# Patient Record
Sex: MALE | Race: WHITE | NOT HISPANIC OR LATINO | Employment: OTHER | URBAN - METROPOLITAN AREA
[De-identification: names, ages, dates, MRNs, and addresses within clinical notes are randomized per-mention and may not be internally consistent; named-entity substitution may affect disease eponyms.]

---

## 2020-01-01 ENCOUNTER — APPOINTMENT (OUTPATIENT)
Dept: CARDIOLOGY | Facility: MEDICAL CENTER | Age: 75
DRG: 871 | End: 2020-01-01
Attending: INTERNAL MEDICINE
Payer: COMMERCIAL

## 2020-01-01 ENCOUNTER — APPOINTMENT (OUTPATIENT)
Dept: RADIOLOGY | Facility: MEDICAL CENTER | Age: 75
DRG: 871 | End: 2020-01-01
Attending: INTERNAL MEDICINE
Payer: COMMERCIAL

## 2020-01-01 ENCOUNTER — HOSPITAL ENCOUNTER (INPATIENT)
Facility: MEDICAL CENTER | Age: 75
LOS: 1 days | DRG: 871 | End: 2020-05-29
Attending: EMERGENCY MEDICINE | Admitting: INTERNAL MEDICINE
Payer: COMMERCIAL

## 2020-01-01 ENCOUNTER — HOSPITAL ENCOUNTER (OUTPATIENT)
Dept: RADIOLOGY | Facility: MEDICAL CENTER | Age: 75
End: 2020-05-29
Payer: MEDICARE

## 2020-01-01 ENCOUNTER — APPOINTMENT (OUTPATIENT)
Dept: RADIOLOGY | Facility: MEDICAL CENTER | Age: 75
DRG: 871 | End: 2020-01-01
Attending: EMERGENCY MEDICINE
Payer: COMMERCIAL

## 2020-01-01 VITALS
HEIGHT: 70 IN | RESPIRATION RATE: 30 BRPM | DIASTOLIC BLOOD PRESSURE: 47 MMHG | TEMPERATURE: 92.8 F | OXYGEN SATURATION: 61 % | SYSTOLIC BLOOD PRESSURE: 62 MMHG | HEART RATE: 62 BPM | BODY MASS INDEX: 24.62 KG/M2 | WEIGHT: 171.96 LBS

## 2020-01-01 LAB
ABO GROUP BLD: NORMAL
ACTION RANGE TRIGGERED IACRT: YES
ACTION RANGE TRIGGERED IACRT: YES
ALBUMIN SERPL BCP-MCNC: 2.5 G/DL (ref 3.2–4.9)
ALBUMIN/GLOB SERPL: 0.8 G/DL
ALP SERPL-CCNC: 195 U/L (ref 30–99)
ALT SERPL-CCNC: 2461 U/L (ref 2–50)
ANION GAP SERPL CALC-SCNC: 29 MMOL/L (ref 7–16)
ANISOCYTOSIS BLD QL SMEAR: ABNORMAL
APPEARANCE UR: ABNORMAL
APTT PPP: 217 SEC (ref 24.7–36)
AST SERPL-CCNC: 3715 U/L (ref 12–45)
BACTERIA #/AREA URNS HPF: ABNORMAL /HPF
BASE EXCESS BLDA CALC-SCNC: -20 MMOL/L (ref -4–3)
BASE EXCESS BLDA CALC-SCNC: -23 MMOL/L (ref -4–3)
BASOPHILS # BLD AUTO: 1.7 % (ref 0–1.8)
BASOPHILS # BLD: 0.19 K/UL (ref 0–0.12)
BILIRUB SERPL-MCNC: 2.1 MG/DL (ref 0.1–1.5)
BILIRUB UR QL STRIP.AUTO: NEGATIVE
BLD GP AB SCN SERPL QL: NORMAL
BODY TEMPERATURE: ABNORMAL DEGREES
BODY TEMPERATURE: ABNORMAL DEGREES
BUN SERPL-MCNC: 29 MG/DL (ref 8–22)
BURR CELLS BLD QL SMEAR: NORMAL
CALCIUM SERPL-MCNC: 7.9 MG/DL (ref 8.5–10.5)
CHLORIDE SERPL-SCNC: 99 MMOL/L (ref 96–112)
CO2 BLDA-SCNC: 14 MMOL/L (ref 20–33)
CO2 BLDA-SCNC: <10 MMOL/L (ref 20–33)
CO2 SERPL-SCNC: 11 MMOL/L (ref 20–33)
COLOR UR: YELLOW
COVID ORDER STATUS COVID19: NORMAL
CREAT SERPL-MCNC: 2.02 MG/DL (ref 0.5–1.4)
EOSINOPHIL # BLD AUTO: 0.1 K/UL (ref 0–0.51)
EOSINOPHIL NFR BLD: 0.9 % (ref 0–6.9)
EPI CELLS #/AREA URNS HPF: ABNORMAL /HPF
ERYTHROCYTE [DISTWIDTH] IN BLOOD BY AUTOMATED COUNT: 55 FL (ref 35.9–50)
GIANT PLATELETS BLD QL SMEAR: NORMAL
GLOBULIN SER CALC-MCNC: 3.1 G/DL (ref 1.9–3.5)
GLUCOSE SERPL-MCNC: 222 MG/DL (ref 65–99)
GLUCOSE UR STRIP.AUTO-MCNC: NEGATIVE MG/DL
HCO3 BLDA-SCNC: 12.1 MMOL/L (ref 17–25)
HCO3 BLDA-SCNC: 7.8 MMOL/L (ref 17–25)
HCT VFR BLD AUTO: 37.1 % (ref 42–52)
HGB BLD-MCNC: 10.6 G/DL (ref 14–18)
HOROWITZ INDEX BLDA+IHG-RTO: 121 MM[HG]
HOROWITZ INDEX BLDA+IHG-RTO: 323 MM[HG]
INR PPP: 3.57 (ref 0.87–1.13)
INST. QUALIFIED PATIENT IIQPT: YES
INST. QUALIFIED PATIENT IIQPT: YES
KETONES UR STRIP.AUTO-MCNC: NEGATIVE MG/DL
LACTATE BLD-SCNC: 13.6 MMOL/L (ref 0.5–2)
LACTATE BLD-SCNC: 14.5 MMOL/L (ref 0.5–2)
LEUKOCYTE ESTERASE UR QL STRIP.AUTO: ABNORMAL
LV EJECT FRACT  99904: 25
LV EJECT FRACT MOD 4C 99902: 10.41
LYMPHOCYTES # BLD AUTO: 1.15 K/UL (ref 1–4.8)
LYMPHOCYTES NFR BLD: 10.3 % (ref 22–41)
MACROCYTES BLD QL SMEAR: ABNORMAL
MANUAL DIFF BLD: NORMAL
MCH RBC QN AUTO: 31.2 PG (ref 27–33)
MCHC RBC AUTO-ENTMCNC: 28.6 G/DL (ref 33.7–35.3)
MCV RBC AUTO: 109.1 FL (ref 81.4–97.8)
METAMYELOCYTES NFR BLD MANUAL: 0.9 %
MICRO URNS: ABNORMAL
MONOCYTES # BLD AUTO: 0.29 K/UL (ref 0–0.85)
MONOCYTES NFR BLD AUTO: 2.6 % (ref 0–13.4)
MORPHOLOGY BLD-IMP: NORMAL
NEUTROPHILS # BLD AUTO: 9.36 K/UL (ref 1.82–7.42)
NEUTROPHILS NFR BLD: 78.4 % (ref 44–72)
NEUTS BAND NFR BLD MANUAL: 5.2 % (ref 0–10)
NITRITE UR QL STRIP.AUTO: POSITIVE
NRBC # BLD AUTO: 0.02 K/UL
NRBC BLD-RTO: 0.2 /100 WBC
NT-PROBNP SERPL IA-MCNC: ABNORMAL PG/ML (ref 0–125)
O2/TOTAL GAS SETTING VFR VENT: 100 %
O2/TOTAL GAS SETTING VFR VENT: 100 %
PCO2 BLDA: 35 MMHG (ref 26–37)
PCO2 BLDA: 54.6 MMHG (ref 26–37)
PH BLDA: 6.95 [PH] (ref 7.4–7.5)
PH BLDA: 6.96 [PH] (ref 7.4–7.5)
PH UR STRIP.AUTO: 6 [PH] (ref 5–8)
PLATELET # BLD AUTO: 52 K/UL (ref 164–446)
PLATELET BLD QL SMEAR: NORMAL
PMV BLD AUTO: 11.3 FL (ref 9–12.9)
PO2 BLDA: 121 MMHG (ref 64–87)
PO2 BLDA: 323 MMHG (ref 64–87)
POIKILOCYTOSIS BLD QL SMEAR: NORMAL
POTASSIUM SERPL-SCNC: 5.7 MMOL/L (ref 3.6–5.5)
PROT SERPL-MCNC: 5.6 G/DL (ref 6–8.2)
PROT UR QL STRIP: 200 MG/DL
PROTHROMBIN TIME: 37.1 SEC (ref 12–14.6)
RBC # BLD AUTO: 3.4 M/UL (ref 4.7–6.1)
RBC # URNS HPF: ABNORMAL /HPF
RBC BLD AUTO: PRESENT
RBC UR QL AUTO: ABNORMAL
RH BLD: NORMAL
SAO2 % BLDA: 100 % (ref 93–99)
SAO2 % BLDA: 95 % (ref 93–99)
SARS-COV-2 RNA RESP QL NAA+PROBE: NOTDETECTED
SODIUM SERPL-SCNC: 139 MMOL/L (ref 135–145)
SP GR UR STRIP.AUTO: 1.01
SPECIMEN DRAWN FROM PATIENT: ABNORMAL
SPECIMEN DRAWN FROM PATIENT: ABNORMAL
SPECIMEN SOURCE: NORMAL
TROPONIN T SERPL-MCNC: 5078 NG/L (ref 6–19)
UROBILINOGEN UR STRIP.AUTO-MCNC: 0.2 MG/DL
WBC # BLD AUTO: 11.2 K/UL (ref 4.8–10.8)
WBC #/AREA URNS HPF: ABNORMAL /HPF

## 2020-01-01 PROCEDURE — 5A2204Z RESTORATION OF CARDIAC RHYTHM, SINGLE: ICD-10-PCS | Performed by: EMERGENCY MEDICINE

## 2020-01-01 PROCEDURE — 36415 COLL VENOUS BLD VENIPUNCTURE: CPT

## 2020-01-01 PROCEDURE — 87077 CULTURE AEROBIC IDENTIFY: CPT

## 2020-01-01 PROCEDURE — 93308 TTE F-UP OR LMTD: CPT

## 2020-01-01 PROCEDURE — 03HY32Z INSERTION OF MONITORING DEVICE INTO UPPER ARTERY, PERCUTANEOUS APPROACH: ICD-10-PCS | Performed by: INTERNAL MEDICINE

## 2020-01-01 PROCEDURE — 81001 URINALYSIS AUTO W/SCOPE: CPT

## 2020-01-01 PROCEDURE — 700102 HCHG RX REV CODE 250 W/ 637 OVERRIDE(OP): Performed by: INTERNAL MEDICINE

## 2020-01-01 PROCEDURE — 85610 PROTHROMBIN TIME: CPT

## 2020-01-01 PROCEDURE — 86850 RBC ANTIBODY SCREEN: CPT

## 2020-01-01 PROCEDURE — 3E043XZ INTRODUCTION OF VASOPRESSOR INTO CENTRAL VEIN, PERCUTANEOUS APPROACH: ICD-10-PCS | Performed by: EMERGENCY MEDICINE

## 2020-01-01 PROCEDURE — 700101 HCHG RX REV CODE 250: Performed by: EMERGENCY MEDICINE

## 2020-01-01 PROCEDURE — 86901 BLOOD TYPING SEROLOGIC RH(D): CPT

## 2020-01-01 PROCEDURE — 70450 CT HEAD/BRAIN W/O DYE: CPT

## 2020-01-01 PROCEDURE — 99291 CRITICAL CARE FIRST HOUR: CPT

## 2020-01-01 PROCEDURE — U0004 COV-19 TEST NON-CDC HGH THRU: HCPCS

## 2020-01-01 PROCEDURE — 83880 ASSAY OF NATRIURETIC PEPTIDE: CPT

## 2020-01-01 PROCEDURE — 94002 VENT MGMT INPAT INIT DAY: CPT

## 2020-01-01 PROCEDURE — 99238 HOSP IP/OBS DSCHRG MGMT 30/<: CPT | Mod: 59 | Performed by: INTERNAL MEDICINE

## 2020-01-01 PROCEDURE — 96367 TX/PROPH/DG ADDL SEQ IV INF: CPT

## 2020-01-01 PROCEDURE — 83605 ASSAY OF LACTIC ACID: CPT | Mod: 91

## 2020-01-01 PROCEDURE — 5A12012 PERFORMANCE OF CARDIAC OUTPUT, SINGLE, MANUAL: ICD-10-PCS | Performed by: EMERGENCY MEDICINE

## 2020-01-01 PROCEDURE — 94760 N-INVAS EAR/PLS OXIMETRY 1: CPT

## 2020-01-01 PROCEDURE — 36620 INSERTION CATHETER ARTERY: CPT | Performed by: INTERNAL MEDICINE

## 2020-01-01 PROCEDURE — 84484 ASSAY OF TROPONIN QUANT: CPT

## 2020-01-01 PROCEDURE — 96368 THER/DIAG CONCURRENT INF: CPT

## 2020-01-01 PROCEDURE — C9803 HOPD COVID-19 SPEC COLLECT: HCPCS | Performed by: EMERGENCY MEDICINE

## 2020-01-01 PROCEDURE — 700105 HCHG RX REV CODE 258: Performed by: EMERGENCY MEDICINE

## 2020-01-01 PROCEDURE — 700101 HCHG RX REV CODE 250

## 2020-01-01 PROCEDURE — 36620 INSERTION CATHETER ARTERY: CPT

## 2020-01-01 PROCEDURE — 99291 CRITICAL CARE FIRST HOUR: CPT | Performed by: INTERNAL MEDICINE

## 2020-01-01 PROCEDURE — 700111 HCHG RX REV CODE 636 W/ 250 OVERRIDE (IP)

## 2020-01-01 PROCEDURE — 80053 COMPREHEN METABOLIC PANEL: CPT

## 2020-01-01 PROCEDURE — 74177 CT ABD & PELVIS W/CONTRAST: CPT

## 2020-01-01 PROCEDURE — 85730 THROMBOPLASTIN TIME PARTIAL: CPT

## 2020-01-01 PROCEDURE — 99292 CRITICAL CARE ADDL 30 MIN: CPT | Mod: 25 | Performed by: INTERNAL MEDICINE

## 2020-01-01 PROCEDURE — 87186 SC STD MICRODIL/AGAR DIL: CPT

## 2020-01-01 PROCEDURE — 99223 1ST HOSP IP/OBS HIGH 75: CPT | Mod: AI | Performed by: INTERNAL MEDICINE

## 2020-01-01 PROCEDURE — 96366 THER/PROPH/DIAG IV INF ADDON: CPT

## 2020-01-01 PROCEDURE — 93308 TTE F-UP OR LMTD: CPT | Mod: 26 | Performed by: INTERNAL MEDICINE

## 2020-01-01 PROCEDURE — 700117 HCHG RX CONTRAST REV CODE 255: Performed by: EMERGENCY MEDICINE

## 2020-01-01 PROCEDURE — 36600 WITHDRAWAL OF ARTERIAL BLOOD: CPT

## 2020-01-01 PROCEDURE — 94770 HCHG CO2 EXPIRED GAS DETERMINATION: CPT

## 2020-01-01 PROCEDURE — 700111 HCHG RX REV CODE 636 W/ 250 OVERRIDE (IP): Performed by: INTERNAL MEDICINE

## 2020-01-01 PROCEDURE — 37799 UNLISTED PX VASCULAR SURGERY: CPT

## 2020-01-01 PROCEDURE — 71045 X-RAY EXAM CHEST 1 VIEW: CPT

## 2020-01-01 PROCEDURE — 5A1935Z RESPIRATORY VENTILATION, LESS THAN 24 CONSECUTIVE HOURS: ICD-10-PCS | Performed by: EMERGENCY MEDICINE

## 2020-01-01 PROCEDURE — 96365 THER/PROPH/DIAG IV INF INIT: CPT

## 2020-01-01 PROCEDURE — 82803 BLOOD GASES ANY COMBINATION: CPT | Mod: 91

## 2020-01-01 PROCEDURE — 96375 TX/PRO/DX INJ NEW DRUG ADDON: CPT

## 2020-01-01 PROCEDURE — 85027 COMPLETE CBC AUTOMATED: CPT

## 2020-01-01 PROCEDURE — 92950 HEART/LUNG RESUSCITATION CPR: CPT

## 2020-01-01 PROCEDURE — 770022 HCHG ROOM/CARE - ICU (200)

## 2020-01-01 PROCEDURE — 71275 CT ANGIOGRAPHY CHEST: CPT

## 2020-01-01 PROCEDURE — 85007 BL SMEAR W/DIFF WBC COUNT: CPT

## 2020-01-01 PROCEDURE — 33210 INSERT ELECTRD/PM CATH SNGL: CPT | Performed by: INTERNAL MEDICINE

## 2020-01-01 PROCEDURE — 86900 BLOOD TYPING SEROLOGIC ABO: CPT

## 2020-01-01 PROCEDURE — 99291 CRITICAL CARE FIRST HOUR: CPT | Mod: 25 | Performed by: INTERNAL MEDICINE

## 2020-01-01 PROCEDURE — 700105 HCHG RX REV CODE 258: Performed by: INTERNAL MEDICINE

## 2020-01-01 PROCEDURE — 87086 URINE CULTURE/COLONY COUNT: CPT

## 2020-01-01 PROCEDURE — C1894 INTRO/SHEATH, NON-LASER: HCPCS

## 2020-01-01 RX ORDER — HEPARIN SODIUM 5000 [USP'U]/100ML
INJECTION, SOLUTION INTRAVENOUS CONTINUOUS
Status: DISCONTINUED | OUTPATIENT
Start: 2020-01-01 | End: 2020-01-01

## 2020-01-01 RX ORDER — SODIUM CHLORIDE 9 MG/ML
1000 INJECTION, SOLUTION INTRAVENOUS ONCE
Status: COMPLETED | OUTPATIENT
Start: 2020-01-01 | End: 2020-01-01

## 2020-01-01 RX ORDER — FAMOTIDINE 20 MG/1
20 TABLET, FILM COATED ORAL DAILY
Status: DISCONTINUED | OUTPATIENT
Start: 2020-05-30 | End: 2020-01-01

## 2020-01-01 RX ORDER — POLYETHYLENE GLYCOL 3350 17 G/17G
1 POWDER, FOR SOLUTION ORAL
Status: DISCONTINUED | OUTPATIENT
Start: 2020-01-01 | End: 2020-01-01 | Stop reason: HOSPADM

## 2020-01-01 RX ORDER — PHENYLEPHRINE HCL IN 0.9% NACL 0.5 MG/5ML
SYRINGE (ML) INTRAVENOUS
Status: DISCONTINUED
Start: 2020-01-01 | End: 2020-01-01 | Stop reason: HOSPADM

## 2020-01-01 RX ORDER — LIDOCAINE HYDROCHLORIDE 20 MG/ML
INJECTION, SOLUTION INFILTRATION; PERINEURAL
Status: COMPLETED
Start: 2020-01-01 | End: 2020-01-01

## 2020-01-01 RX ORDER — PHENYLEPHRINE HCL IN 0.9% NACL 0.5 MG/5ML
100 SYRINGE (ML) INTRAVENOUS
Status: DISCONTINUED | OUTPATIENT
Start: 2020-01-01 | End: 2020-01-01

## 2020-01-01 RX ORDER — EPINEPHRINE HCL IN 0.9 % NACL 4MG/250ML
0-10 PLASTIC BAG, INJECTION (ML) INTRAVENOUS CONTINUOUS
Status: DISCONTINUED | OUTPATIENT
Start: 2020-01-01 | End: 2020-01-01

## 2020-01-01 RX ORDER — IPRATROPIUM BROMIDE AND ALBUTEROL SULFATE 2.5; .5 MG/3ML; MG/3ML
3 SOLUTION RESPIRATORY (INHALATION)
Status: DISCONTINUED | OUTPATIENT
Start: 2020-01-01 | End: 2020-01-01 | Stop reason: HOSPADM

## 2020-01-01 RX ORDER — HEPARIN SODIUM,PORCINE 1000/ML
VIAL (ML) INJECTION
Status: COMPLETED
Start: 2020-01-01 | End: 2020-01-01

## 2020-01-01 RX ORDER — NOREPINEPHRINE BITARTRATE 0.03 MG/ML
0-30 INJECTION, SOLUTION INTRAVENOUS CONTINUOUS
Status: DISCONTINUED | OUTPATIENT
Start: 2020-01-01 | End: 2020-01-01

## 2020-01-01 RX ORDER — PHENYLEPHRINE HCL IN 0.9% NACL 0.5 MG/5ML
100-200 SYRINGE (ML) INTRAVENOUS
Status: DISPENSED | OUTPATIENT
Start: 2020-01-01 | End: 2020-01-01

## 2020-01-01 RX ORDER — DEXTROSE MONOHYDRATE 25 G/50ML
50 INJECTION, SOLUTION INTRAVENOUS
Status: DISCONTINUED | OUTPATIENT
Start: 2020-01-01 | End: 2020-01-01 | Stop reason: HOSPADM

## 2020-01-01 RX ORDER — PHENYLEPHRINE HYDROCHLORIDE 10 MG/ML
INJECTION, SOLUTION INTRAMUSCULAR; INTRAVENOUS; SUBCUTANEOUS
Status: DISCONTINUED
Start: 2020-01-01 | End: 2020-01-01 | Stop reason: HOSPADM

## 2020-01-01 RX ORDER — AMOXICILLIN 250 MG
2 CAPSULE ORAL 2 TIMES DAILY
Status: DISCONTINUED | OUTPATIENT
Start: 2020-01-01 | End: 2020-01-01 | Stop reason: HOSPADM

## 2020-01-01 RX ORDER — SODIUM CHLORIDE 9 MG/ML
2000 INJECTION, SOLUTION INTRAVENOUS ONCE
Status: COMPLETED | OUTPATIENT
Start: 2020-01-01 | End: 2020-01-01

## 2020-01-01 RX ORDER — BISACODYL 10 MG
10 SUPPOSITORY, RECTAL RECTAL
Status: DISCONTINUED | OUTPATIENT
Start: 2020-01-01 | End: 2020-01-01 | Stop reason: HOSPADM

## 2020-01-01 RX ORDER — NOREPINEPHRINE BITARTRATE 0.03 MG/ML
INJECTION, SOLUTION INTRAVENOUS
Status: DISCONTINUED
Start: 2020-01-01 | End: 2020-01-01 | Stop reason: HOSPADM

## 2020-01-01 RX ADMIN — EPINEPHRINE 20 MCG/MIN: 1 INJECTION INTRAMUSCULAR; INTRAVENOUS; SUBCUTANEOUS at 16:56

## 2020-01-01 RX ADMIN — CALCIUM GLUCONATE 2 G: 98 INJECTION, SOLUTION INTRAVENOUS at 16:12

## 2020-01-01 RX ADMIN — NOREPINEPHRINE BITARTRATE 80 MCG/MIN: 1 INJECTION INTRAVENOUS at 15:31

## 2020-01-01 RX ADMIN — Medication 200 MCG: at 14:59

## 2020-01-01 RX ADMIN — SODIUM CHLORIDE 2000 ML: 9 INJECTION, SOLUTION INTRAVENOUS at 13:45

## 2020-01-01 RX ADMIN — DOBUTAMINE HYDROCHLORIDE 20 MCG/KG/MIN: 100 INJECTION INTRAVENOUS at 13:50

## 2020-01-01 RX ADMIN — IOHEXOL 100 ML: 350 INJECTION, SOLUTION INTRAVENOUS at 08:45

## 2020-01-01 RX ADMIN — Medication 200 MCG: at 14:46

## 2020-01-01 RX ADMIN — NOREPINEPHRINE BITARTRATE 30 MCG/MIN: 1 INJECTION INTRAVENOUS at 12:35

## 2020-01-01 RX ADMIN — SODIUM BICARBONATE 150 MEQ: 84 INJECTION, SOLUTION INTRAVENOUS at 15:32

## 2020-01-01 RX ADMIN — HEPARIN SODIUM: 1000 INJECTION, SOLUTION INTRAVENOUS; SUBCUTANEOUS at 16:58

## 2020-01-01 RX ADMIN — EPINEPHRINE 2 MCG/MIN: 1 INJECTION INTRAMUSCULAR; INTRAVENOUS; SUBCUTANEOUS at 12:50

## 2020-01-01 RX ADMIN — PHENYLEPHRINE HYDROCHLORIDE 50 MCG/MIN: 10 INJECTION INTRAVENOUS at 14:57

## 2020-01-01 RX ADMIN — VASOPRESSIN 0.03 UNITS/MIN: 20 INJECTION INTRAVENOUS at 15:32

## 2020-01-01 RX ADMIN — SODIUM CHLORIDE 1000 ML: 9 INJECTION, SOLUTION INTRAVENOUS at 13:15

## 2020-01-01 RX ADMIN — LIDOCAINE HYDROCHLORIDE: 20 INJECTION, SOLUTION INFILTRATION; PERINEURAL at 16:58

## 2020-05-29 PROBLEM — R00.1 BRADYCARDIA: Status: ACTIVE | Noted: 2020-01-01

## 2020-05-29 PROBLEM — R73.9 HYPERGLYCEMIA: Status: ACTIVE | Noted: 2020-01-01

## 2020-05-29 PROBLEM — R57.9 SHOCK (HCC): Status: ACTIVE | Noted: 2020-01-01

## 2020-05-29 PROBLEM — I71.21 ASCENDING AORTIC ANEURYSM (HCC): Status: ACTIVE | Noted: 2020-01-01

## 2020-05-29 PROBLEM — R74.8 ELEVATED LIVER ENZYMES: Status: ACTIVE | Noted: 2020-01-01

## 2020-05-29 PROBLEM — J96.01 ACUTE RESPIRATORY FAILURE WITH HYPOXIA (HCC): Status: ACTIVE | Noted: 2020-01-01

## 2020-05-29 PROBLEM — R65.21 SEPTIC SHOCK (HCC): Status: ACTIVE | Noted: 2020-01-01

## 2020-05-29 PROBLEM — E87.29 METABOLIC ACIDOSIS, INCREASED ANION GAP: Status: ACTIVE | Noted: 2020-01-01

## 2020-05-29 PROBLEM — A41.9 SEPTIC SHOCK (HCC): Status: ACTIVE | Noted: 2020-01-01

## 2020-05-29 PROBLEM — I46.9 CARDIAC ARREST (HCC): Status: ACTIVE | Noted: 2020-01-01

## 2020-05-29 NOTE — ASSESSMENT & PLAN NOTE
He found to have shock it could be septic shock or cardiogenic.  Started him on pressor pain  Broad-spectrum antibiotic.  IV fluids.  Admit to ICU for close monitoring.

## 2020-05-29 NOTE — ED NOTES
Lab called with critical result of LACTIC ACID 14.5 at 1312. Critical lab result read back to LAB.    notified of critical lab result at 1313.  Critical lab result read back by Dr. LOWRY.

## 2020-05-29 NOTE — DISCHARGE PLANNING
Care Transition Team Discharge Planning    Anticipated Discharge Disposition: TBD    Action: Received report from ER Elin Samson, see her previous note.    Barriers to Discharge: TBD    Plan: f/u w/ medical team

## 2020-05-29 NOTE — ED NOTES
Spoke to [facility], keep updated as necessary and of any changes in code status or condition change.

## 2020-05-29 NOTE — CONSULTS
Critical Care Consultation    Date of consult: 5/29/2020    Referring Physician  Wong Tapia M.D.    Reason for Consultation  Cardiac arrest    History of Presenting Illness  74 y.o. male who presented 5/29/2020 with respiratory failure and shortness of breath at outlying facility. He was intubated. Following intubation had cardiac arrest for approximately 1 minute.  ROSC achieved. Additional short arrest on the transport to this facility for 1-2 minutes. He had additional cardiac arrest in ER for 3 rounds of cpr and received sodium bicarb.  He had a sbp of 40-60 systolic on levophed and epinephrine, I increased levophed to 50 mcg/min and epinephrine to 20 mcg/min and added neosynephrine and vasopressin.  He was sent for ct head of which appears normal on my read and cta chest that does not appear to have PE. He has small bilateral effusions, some changes for aspiration and has ascending aortic aneurysm.  Ph 6.9. Lactic acid 10.  Has pacing subcutaneously.  Cardiology saw and no intervention for now. Received TPA at outside facility for ? STEMI.  No family contacts.  Social work working on finding contacts.  Ordered 2 L NS bolus. Pending labs.  covid pending but less likely primary cause of respiratory failure. Given ceftriaxone. Ordered zosyn and vanco for sepsis unknown etiology. On ventilator, rate adjusted to 30 and volume to 500 ml based on abg.      Code Status  Full code    Review of Systems  Review of Systems   Unable to perform ROS: Intubated       Past Medical History   has a past medical history of Fall, Seizure, and Unspecified urinary incontinence. He also has no past medical history of Diabetes.    Surgical History   has a past surgical history that includes other orthopedic surgery and other neurological surg.    Family History  family history is not on file.    Social History   reports that he has been smoking cigarettes. He has a 60.00 pack-year smoking history. He does not have any smokeless  tobacco history on file. He reports that he does not drink alcohol or use drugs.    Medications  Home Medications    **Home medications have not yet been reviewed for this encounter**       Current Facility-Administered Medications   Medication Dose Route Frequency Provider Last Rate Last Dose   • EPINEPHrine (Adrenalin) infusion 4 mg/250 mL (premix)  0-10 mcg/min Intravenous Continuous Wong Tapia M.D.       • norepinephrine (Levophed) infusion 8 mg/250 mL (premix)  0-30 mcg/min Intravenous Continuous Wong Tapia M.D.         Current Outpatient Medications   Medication Sig Dispense Refill   • amantadine (SYMMETREL) 100 MG CAPS Take 100 mg by mouth 2 times a day.     • divalproex EC (DEPAKOTE) 250 MG TBEC Take 250 mg by mouth 3 times a day.     • metoprolol SR (TOPROL XL) 25 MG TB24 Take 25 mg by mouth 2 Times a Day.     • simvastatin (ZOCOR) 20 MG TABS Take 20 mg by mouth every evening.     • docusate sodium (COLACE) 100 MG CAPS Take 100 mg by mouth every day.     • famotidine (PEPCID) 20 MG TABS Take 20 mg by mouth 2 times a day.     • multivitamin (THERAGRAN) TABS Take 1 Tab by mouth every day.     • ascorbic acid (ASCORBIC ACID) 500 MG TABS Take 500 mg by mouth 2 Times a Day.     • vitamin D (CHOLECALCIFEROL) 1000 UNIT TABS Take 1,000 Units by mouth every day.     • lactobacillus rhamnosus, GG, (CULTURELLE) 10 B CELL CAPS Take 1 Cap by mouth every day.     • benzonatate (TESSALON) 100 MG CAPS Take 100 mg by mouth 3 times a day.     • pregabalin (LYRICA) 75 MG CAPS Take 75 mg by mouth 2 times a day.     • amitriptyline (ELAVIL) 50 MG TABS Take 50 mg by mouth every day.     • tramadol (ULTRAM) 50 MG TABS Take 100 mg by mouth 4 times a day.     • diazepam (VALIUM) 5 MG TABS Take 5 mg by mouth 3 times a day as needed.     • hydrochlorothiazide (HYDRODIURIL) 25 MG TABS Take 12.5 mg by mouth 2 Times a Day.     • lisinopril (PRINIVIL) 20 MG TABS Take 20 mg by mouth 2 times a day.     • baclofen (LIORESAL) 10 MG  TABS Take 300 mg by mouth 3 times a day.     • amlodipine (NORVASC) 5 MG TABS Take 5 mg by mouth every day.     • ketoconazole (NIZORAL) 2 % CREA Apply  to affected area(s) every day. For rash          Allergies  Allergies   Allergen Reactions   • Demerol      Pt states his heart stops when he is given this drug   • Flomax [Kdc:Brilliant Blue Fcf+Tamsulosin]        Vital Signs last 24 hours  Pulse:  [60-68] 68  Resp:  [19-20] 19  BP: (51)/(29) 51/29  SpO2:  [93 %-100 %] 93 %    Physical Exam  Physical Exam  Vitals signs and nursing note reviewed.   Constitutional:       General: He is in acute distress.      Appearance: He is ill-appearing and toxic-appearing. He is not diaphoretic.   HENT:      Head: Normocephalic and atraumatic.      Right Ear: External ear normal.      Left Ear: External ear normal.      Nose: No congestion or rhinorrhea.      Mouth/Throat:      Mouth: Mucous membranes are dry.      Pharynx: No oropharyngeal exudate or posterior oropharyngeal erythema.   Eyes:      General: No scleral icterus.        Right eye: No discharge.         Left eye: No discharge.      Conjunctiva/sclera: Conjunctivae normal.   Neck:      Musculoskeletal: Normal range of motion. No neck rigidity or muscular tenderness.   Cardiovascular:      Rate and Rhythm: Regular rhythm. Bradycardia present.      Pulses: Normal pulses.      Heart sounds: Normal heart sounds. No murmur.   Pulmonary:      Effort: Respiratory distress present.      Breath sounds: No stridor. No wheezing, rhonchi or rales.      Comments: On ventilator  Chest:      Chest wall: No tenderness.   Abdominal:      General: There is no distension.      Palpations: There is no mass.      Tenderness: There is no abdominal tenderness. There is no guarding.   Musculoskeletal:         General: No swelling, tenderness or deformity.      Right lower leg: No edema.      Left lower leg: No edema.   Lymphadenopathy:      Cervical: No cervical adenopathy.   Skin:      Coloration: Skin is pale. Skin is not jaundiced.      Findings: No bruising, erythema, lesion or rash.      Comments: mottled   Neurological:      Mental Status: He is alert.      Cranial Nerves: No cranial nerve deficit.      Sensory: No sensory deficit.      Motor: No weakness.      Coordination: Coordination normal.      Gait: Gait normal.      Comments: gcs less than 5   Psychiatric:         Mood and Affect: Mood normal.         Behavior: Behavior normal.         Thought Content: Thought content normal.         Judgment: Judgment normal.         Fluids  No intake or output data in the 24 hours ending 05/29/20 1257    Laboratory  No results found for this or any previous visit (from the past 48 hour(s)).    Imaging  CT-ABDOMEN-PELVIS WITH   Final Result      1.  Occlusion of the left external external iliac, common femoral, superficial femoral and profunda femoris arteries secondary to advanced atherosclerotic disease.   2.  Trace bilateral pleural effusions, left greater than right with associated compressive atelectasis and/or consolidation.   3.  Complex appearing left inferior renal cyst. Further evaluation of this finding is recommended with ultrasound or dedicated renal MRI when possible.   4.  Bilateral renal atrophy.   5.  Diverticulosis without evidence of diverticulitis.      CT-CTA CHEST PULMONARY ARTERY W/ RECONS   Final Result      1.  No pulmonary embolus.   2.  Small left pleural effusion and associated atelectasis. No pulmonary consolidation.   3.  Emphysema.   4.  Incidental 6 mm nodule in the right upper lobe. Follow-up guidelines for high and low risk patients are outlined below.   5.  A 5 cm ascending aortic aneurysm.            Pulmonary nodule guidelines:      Low Risk: CT at 6-12 months, then consider CT at 18-24 months      High Risk: CT at 6-12 months, then CT at 18-24 months      Low Risk - Minimal or absent history of smoking and of other known risk factors.      High Risk - History of  smoking or of other known risk factors.      Note: These recommendations do not apply to lung cancer screening, patients with immunosuppression, or patients with known primary cancer.      Fleischner Society 2017 Guidelines for Management of Incidentally Detected Pulmonary Nodules in Adults               CT-HEAD W/O   Final Result      1. No CT evidence of acute infarct, hemorrhage or mass.   2. Moderate global parenchymal atrophy. Chronic small vessel ischemic changes.   3. Old left occipital infarct.      OUTSIDE IMAGES-DX CHEST   Final Result      EC-ECHOCARDIOGRAM LTD W/O CONT    (Results Pending)   DX-CHEST-PORTABLE (1 VIEW)    (Results Pending)   DX-CHEST-PORTABLE (1 VIEW)    (Results Pending)       Assessment/Plan  Ascending aortic aneurysm (HCC)  Assessment & Plan  5.2 cm on echo  Seen on ct imaging      Hyperglycemia  Assessment & Plan  ? Diabetes  hgaic  Likely from inflammatory event  ssi    Metabolic acidosis, increased anion gap  Assessment & Plan  Secondary to shock  Multifactorial  Fluids  Multiple pressors  Sodium bicarb push and drip    Septic shock (HCC)  Assessment & Plan  Present on admission  Respiratory  Possible but inflammatory event may just be due to cardiogenic shock and arrest  vasopresors  IVF  antibiotics    Bradycardia  Assessment & Plan  Subcutaneous pacing  If stabilizes may need transvenous pacing, discussed with cardiology  echo    Acute respiratory failure with hypoxia (HCC)  Assessment & Plan  Respiratory failure with hypoxia  On ventilator  Cardiac arrest  Sepsis  Ascending aortic aneurysm  Fentanyl push and drip    Cardiac arrest (HCC)  Assessment & Plan  Cardiac arrest, multiple, short duration  Ct head adequatre  Ascending aortic aneurysm  Cardiogenic and septic shock  Multiple pressors  No family available  ? Septic shock    Addendum  On dobutamin from outside, weaned, worsening pressure, then increased and pressure did not change.  Weaned off.  Discussed with cardiology of  whom agreed  Pending pacemaker wires transvenous if stabilizes.    EF on my bedside echo probably less than 10%. Global dysfunction  Adding jamari, epi to 20, levo to 80  Repeat abg  Dr Tapia er physician and I made decision to be DNAR as further code     Addendum  Weaning pressors  Transvenous pacing pending for cath lab, discussed with cardiology  GI bleed upper, started ppi drip  Hb ordered  Stopped heparin drip.    Addendum  Transvenous pacing wires placed    Discussed patient condition and risk of morbidity and/or mortality with RN, RT, Pharmacy, Code status disscussed and Patient.      The patient remains critically ill.  Post cardiac arrest.  Unable to cool as may need procedures and multiple cardiac arrests. Possible septic shock.  On mechanical ventilator.  Epinephrine, vasopressin, neosynephrine, levophed for map > 65 and sbp > 90.  Sodium bicarbonate drip.  Ascending aortic aneurysm.  Heparin drip, stopped because of GI bleeding.  Received antithrombolytics outside facility.  PPI drip.  Critical care time = 175 minutes in directly providing and coordinating critical care and extensive data review.  No time overlap and excludes procedures.

## 2020-05-29 NOTE — CONSULTS
Cardiology Initial Consultation    Date of Service  5/29/2020    Referring Physician  Wong Tapia M.D.    Reason for Consultation  PEA cardiac arrest and respiratory failure    History of Presenting Illness  Warner Sprague is a 74 y.o. male who presented to [place] on 5/29/2020 with respiratory distress and respiratory failure.  At one point he was intubated.  It was thought he may have ST elevation so he was given TPA.  The actual timing of events is not clear.  He was brought by helicopter.  In route he was hypotensive and requiring pressors.  He was bradycardic and was transcutaneously paced.  Upon arrival to [facility], he had no pulse.  2 rounds of CPR and 1 round of epi with return of a femoral pulse.      No other history available.  ECGs reviewed from [facility] contain significant artifact. I did not one ecg with old anterior q waves, another ecg with some aberrancy and possible posterior ST elevation but too much artifact to be sure.  Throughout, sinus bradycardia with sinus arrest, junctional and ventricular escape beats.    I am told lactic acid in [place] was 10 upon arrival.    Patient currently has a blood pressure 61/41 despite epinephrine, Levophed and dobutamine.  He continues to be externally paced.    pH here 6.9  AST and ALT over 3000 and 2000  Creatinine 2.02  Potassium 5.7 in the setting of severe acidosis  Troponin 5000 after CPR  INR 3.57    Review of Systems  Review of Systems   Unable to perform ROS: Intubated       Past Medical History   has a past medical history of Fall, Seizure, and Unspecified urinary incontinence. He also has no past medical history of Diabetes.    Surgical History   has a past surgical history that includes other orthopedic surgery and other neurological surg.    Family History  family history is not on file.    Social History   reports that he has been smoking cigarettes. He has a 60.00 pack-year smoking history. He does not have any smokeless tobacco history on file. He  reports that he does not drink alcohol or use drugs.    Medications  Prior to Admission Medications   Prescriptions Last Dose Informant Patient Reported? Taking?   amantadine (SYMMETREL) 100 MG CAPS   Yes No   Sig: Take 100 mg by mouth 2 times a day.   amitriptyline (ELAVIL) 50 MG TABS   Yes No   Sig: Take 50 mg by mouth every day.   amlodipine (NORVASC) 5 MG TABS   Yes No   Sig: Take 5 mg by mouth every day.   ascorbic acid (ASCORBIC ACID) 500 MG TABS   Yes No   Sig: Take 500 mg by mouth 2 Times a Day.   baclofen (LIORESAL) 10 MG TABS   Yes No   Sig: Take 300 mg by mouth 3 times a day.   benzonatate (TESSALON) 100 MG CAPS   Yes No   Sig: Take 100 mg by mouth 3 times a day.   diazepam (VALIUM) 5 MG TABS   Yes No   Sig: Take 5 mg by mouth 3 times a day as needed.   divalproex EC (DEPAKOTE) 250 MG TBEC   Yes No   Sig: Take 250 mg by mouth 3 times a day.   docusate sodium (COLACE) 100 MG CAPS   Yes No   Sig: Take 100 mg by mouth every day.   famotidine (PEPCID) 20 MG TABS   Yes No   Sig: Take 20 mg by mouth 2 times a day.   hydrochlorothiazide (HYDRODIURIL) 25 MG TABS   Yes No   Sig: Take 12.5 mg by mouth 2 Times a Day.   ketoconazole (NIZORAL) 2 % CREA   Yes No   Sig: Apply  to affected area(s) every day. For rash    lactobacillus rhamnosus, GG, (CULTURELLE) 10 B CELL CAPS   Yes No   Sig: Take 1 Cap by mouth every day.   lisinopril (PRINIVIL) 20 MG TABS   Yes No   Sig: Take 20 mg by mouth 2 times a day.   metoprolol SR (TOPROL XL) 25 MG TB24   Yes No   Sig: Take 25 mg by mouth 2 Times a Day.   multivitamin (THERAGRAN) TABS   Yes No   Sig: Take 1 Tab by mouth every day.   pregabalin (LYRICA) 75 MG CAPS   Yes No   Sig: Take 75 mg by mouth 2 times a day.   simvastatin (ZOCOR) 20 MG TABS   Yes No   Sig: Take 20 mg by mouth every evening.   tramadol (ULTRAM) 50 MG TABS   Yes No   Sig: Take 100 mg by mouth 4 times a day.   vitamin D (CHOLECALCIFEROL) 1000 UNIT TABS   Yes No   Sig: Take 1,000 Units by mouth every day.       Facility-Administered Medications: None       Allergies  Allergies   Allergen Reactions   • Demerol      Pt states his heart stops when he is given this drug   • Flomax [Kdc:Brilliant Blue Fcf+Tamsulosin]        Vital signs in last 24 hours  Pulse:  [60-68] 68  Resp:  [19-20] 19  BP: (51)/(29) 51/29  SpO2:  [93 %-100 %] 93 %    Physical Exam  Physical Exam     General: No acute distress. Well nourished.  Intubated.  HEENT:  No scleral icterus, no pharyngeal erythema, ET tube in place  Neck:  No JVD at 0, no bruits, trachea midline  CVS: RRR.  Distant heart sounds no LE edema.  1+ radial pulses, trace PT pulses, pacemaker pads on chest  Resp: Coarse breath sounds throughout.  Respiratory effort per the ventilator.  Abdomen: Soft, NT, no ricardo hepatomegaly.  Surgical scar right groin  MSK/Ext: No clubbing or cyanosis.  Surgical scar left shoulder  Skin: Dry, cool lower extremities  Neurological: Deferred, intubated and sedated  Psych: Deferred, intubated and sedated        Lab Review  Lab Results   Component Value Date/Time    WBC 7.1 08/21/2012 04:25 AM    RBC 4.03 (L) 08/21/2012 04:25 AM    HEMOGLOBIN 11.9 (L) 08/21/2012 04:25 AM    HEMATOCRIT 38.2 (L) 08/21/2012 04:25 AM    MCV 94.9 08/21/2012 04:25 AM    MCH 29.5 08/21/2012 04:25 AM    MCHC 31.1 08/21/2012 04:25 AM    MPV 7.8 08/21/2012 04:25 AM      Lab Results   Component Value Date/Time    SODIUM 136 08/21/2012 04:25 AM    POTASSIUM 4.3 08/21/2012 04:25 AM    CHLORIDE 102 08/21/2012 04:25 AM    CO2 26 08/21/2012 04:25 AM    GLUCOSE 84 08/21/2012 04:25 AM    BUN 23 (H) 08/21/2012 04:25 AM    CREATININE 1.01 08/21/2012 04:25 AM      Lab Results   Component Value Date/Time    ASTSGOT 16 08/21/2012 04:25 AM    ALTSGPT 18 08/21/2012 04:25 AM     No results found for: CHOLSTRLTOT, LDL, HDL, TRIGLYCERIDE, TROPONINT    No results for input(s): NTPROBNP in the last 72 hours.    Cardiac Imaging and Procedures Review  EKG:  My personal interpretation of the EKG and  telemetry strips dated 5/29/2020 at [place] sinus bradycardia with sinus arrest, junctional and ventricular escape, intermittent aberrancy, probable old anterior MI, less than baseline artifact    ECG here not obtained, patient still externally paced, unsafe to return off external pacemaker given persistent hypotension    Echocardiogram: Pending    Cardiac Catheterization: Pending    Imaging  Chest X-Ray: Pending      Assessment/Plan  -PEA cardiac arrest  -Acute hypoxic respiratory failure  -Acute encephalopathy  -Abnormal ECG, concern for STEMI, status post lytics at outside hospital  -Ongoing shock  -Lactic acidosis  -Metabolic acidosis  -Transaminitis  -Coagulopathy  -MYA  -Hyperkalemia    PLAN:  -Stat CT of head given TPA and acute encephalopathy  -Stat echocardiogram  -He is not a candidate for left heart catheterization due to acidosis, age and I did not see evidence of ST elevation MI  -His prognosis is grim, I do not think he will survive.  Critical care will attempt to further resuscitate.  If they achieve progress, we can consider temporary pacemaker at that time.      Cardiology Critical Care Documentation    This patient is critically ill.  I have spent a total of 40 minutes examining this patient, all lab data, imaging including ecg and echo, and discussion with other providers including MD, RN.    Discussed with Dr. Tapia and Dr. Don    Thank you for allowing me to participate in the care of this patient.    I will continue to follow this patient    Please contact me with any questions.    Meeta Saez M.D.   Cardiologist, [facility]  [phone]

## 2020-05-29 NOTE — ASSESSMENT & PLAN NOTE
Present on admission  Respiratory  Possible but inflammatory event may just be due to cardiogenic shock and arrest  vasopresors  IVF  antibiotics

## 2020-05-29 NOTE — ASSESSMENT & PLAN NOTE
Respiratory failure with hypoxia  On ventilator  Cardiac arrest  Sepsis  Ascending aortic aneurysm  Fentanyl push and drip

## 2020-05-29 NOTE — ED NOTES
Unable to obtain med rec at this time. Left voicemail with emergency contact. No pharmacy on record.

## 2020-05-29 NOTE — ED TRIAGE NOTES
Pt here as transfer from [place]. Pt originally brought in by medics with SOB. Found to have ST elevation and went into respiratory arrest. Pt intubated and hypotensive.     In route pt went into cardiac arrest and had one round of CPR and epi. Currently paced.

## 2020-05-29 NOTE — PROCEDURES
Procedures  Arterial line, right axillary, us guided  Reason: cardiogenic and septic shock    Procedure:  Sterile procedure.  Draped appropriately.  Right axillary artery.  US guided.  Needle inserted with flash obtained.  Guidewire inserted through needle, catheter progressed over wire, wire removed, catheter sutured in place with two sutures, chlorhexidine patch and bandage applied.  Connected appropriately.  No complications. Less than 3 cc blood loss.

## 2020-05-29 NOTE — DISCHARGE PLANNING
Medical Social Work    MSW responded to acute medical pt in [employer]. Pt was transferred from [facility] in [place], [place]. CPR started shortly after arrival. Per flight crew, pt was found by his home health caregiver. No family that they are aware of.     MSW did chart review. Pt had advanced directive on file from . MSW tried calling listed wife/1st  DPOA Lizzie Sprague ([phone] (disconnected) /[phone] (unable to receive phone calls). MSW called alternative DPOA listed Nirmala Ayala ([phone]). Nirmala stated pt's wife is  and pt had AD changed as they had a falling out. Nirmala stated she is not comfortable making decisions for pt. Nirmala stated pt is estranged from any other family.     MSW called [facility] ED ([phone]) and spoke to Ramez in AOD. Ramez stated he will send MSW pt's latest updated AD scanned into their system in . Ramez faxed over updated AD to MSW. Pt does not list a healthcare DPOA on updated paperwork. MSW gave AD to ERP to look over. MSW provided ER PAR Tanika AD to scan into Epic.    MSW did enformion [facility] Search. Nehemiah Chavez listed [phone] stated he was not related to pt. MSW called Margo Prince ([phone]) and Ilya stated he was not related to pt. Kimberly Velazquez is listed as .     MSW will continue to search for [facility]K.     [facility] [place] Referral #[id number].

## 2020-05-29 NOTE — ED PROVIDER NOTES
ED Provider Note    CHIEF COMPLAINT  Chief Complaint   Patient presents with   • Respiratory Arrest       HPI  Bartolo Sprague is a 74 y.o. male who presents to the emergency department transferred by air ambulance from the ER in [facility].  The time of arrival the patient is intubated and unresponsive and unable to provide any history.  The history is obtained by discussion with the ER doctor in [place] over the phone and review of the notes from that facility.  According to the notes the patient arrived in their hospital earlier this morning complaining of shortness of breath he was found to be hypoxic hypothermic and bradycardic with borderline hypotension and also tachypnea.  The patient was intubated in [place] and received thrombolytic therapy for abnormal EKGs and a central line was placed prior to transfer.  The patient was started on norepinephrine and dobutamine and according to the EMS crew the patient had a brief episode of cardiac arrest in the ER at [place] and this only lasted about a minute and he had return of pulses.  During transfer in the helicopter the patient became profoundly bradycardic and the EMS crew maxed out doses of pressor agents and started transcutaneous pacing.  The patient is undergoing transcutaneous pacing at the time of arrival.  No other history of present illness could be obtained from the patient    REVIEW OF SYSTEMS review of systems information could be obtained    PAST MEDICAL HISTORY  Past Medical History:   Diagnosis Date   • Fall    • Seizure    • Unspecified urinary incontinence        FAMILY HISTORY  No family history on file.    SOCIAL HISTORY  Social History     Socioeconomic History   • Marital status: Unknown     Spouse name: Not on file   • Number of children: Not on file   • Years of education: Not on file   • Highest education level: Not on file   Occupational History   • Not on file   Social Needs   • Financial resource strain: Not on file   • Food  insecurity     Worry: Not on file     Inability: Not on file   • Transportation needs     Medical: Not on file     Non-medical: Not on file   Tobacco Use   • Smoking status: Current Every Day Smoker     Packs/day: 1.00     Years: 60.00     Pack years: 60.00     Types: Cigarettes   Substance and Sexual Activity   • Alcohol use: No   • Drug use: No   • Sexual activity: Not on file   Lifestyle   • Physical activity     Days per week: Not on file     Minutes per session: Not on file   • Stress: Not on file   Relationships   • Social connections     Talks on phone: Not on file     Gets together: Not on file     Attends Alevism service: Not on file     Active member of club or organization: Not on file     Attends meetings of clubs or organizations: Not on file     Relationship status: Not on file   • Intimate partner violence     Fear of current or ex partner: Not on file     Emotionally abused: Not on file     Physically abused: Not on file     Forced sexual activity: Not on file   Other Topics Concern   • Not on file   Social History Narrative   • Not on file       SURGICAL HISTORY  Past Surgical History:   Procedure Laterality Date   • OTHER NEUROLOGICAL SURG     • OTHER ORTHOPEDIC SURGERY         CURRENT MEDICATIONS  Home Medications     Reviewed by Sandra Kevin (Pharmacy Tech) on 05/29/20 at 1359  Med List Status: Unable to Obtain   Medication Last Dose Status   amantadine (SYMMETREL) 100 MG CAPS  Active   amitriptyline (ELAVIL) 50 MG TABS  Active   amlodipine (NORVASC) 5 MG TABS  Active   ascorbic acid (ASCORBIC ACID) 500 MG TABS  Active   baclofen (LIORESAL) 10 MG TABS  Active   benzonatate (TESSALON) 100 MG CAPS  Active   diazepam (VALIUM) 5 MG TABS  Active   divalproex EC (DEPAKOTE) 250 MG TBEC  Active   docusate sodium (COLACE) 100 MG CAPS  Active   famotidine (PEPCID) 20 MG TABS  Active   hydrochlorothiazide (HYDRODIURIL) 25 MG TABS  Active   ketoconazole (NIZORAL) 2 % CREA  Active   lactobacillus  "rhamnosus, GG, (CULTURELLE) 10 B CELL CAPS  Active   lisinopril (PRINIVIL) 20 MG TABS  Active   metoprolol SR (TOPROL XL) 25 MG TB24  Active   multivitamin (THERAGRAN) TABS  Active   pregabalin (LYRICA) 75 MG CAPS  Active   simvastatin (ZOCOR) 20 MG TABS  Active   tramadol (ULTRAM) 50 MG TABS  Active   vitamin D (CHOLECALCIFEROL) 1000 UNIT TABS  Active                ALLERGIES  Allergies   Allergen Reactions   • Demerol      Pt states his heart stops when he is given this drug   • Flomax [Kdc:Brilliant Blue Fcf+Tamsulosin]        PHYSICAL EXAM  VITAL SIGNS: BP (!) 75/50   Pulse 60   Temp (!) 35.1 °C (95.1 °F) (Temporal)   Resp (!) 30   Ht 1.778 m (5' 10\")   Wt 78 kg (171 lb 15.3 oz)   SpO2 (!) 66%   BMI 24.67 kg/m²    Oxygen saturation is interpreted as hypoxic with intubation and ventilator support  Constitutional: The patient is completely flaccid and unresponsive  HENT: No obvious signs of trauma to the head there is an endotracheal tube in place  Eyes: Pupils are fixed and the cornea are desiccated there is no jaundice  Neck: Trachea midline no JVD there is a central line in the right side of the neck  Cardiovascular: The patient is being paced transcutaneously at the time of arrival and he had no pulses.  Lungs: Equal breath sounds with ventilator support  Abdomen/Back: Soft and nondistended  Skin: Cool and dry  Musculoskeletal: No acute bony deformity  Neurologic: Completely flaccid no response to any stimuli    CHART REVIEW  Reviewed the chart materials noted above and also the laboratory tests that were sent with the patient and this showed a troponin of 65 a BNP of 1066 d-dimer of 2970 slightly elevated LFTs urinalysis abnormal with large leukocyte Estrace and nitrite and many white blood cells, white blood cell count showed value of 11.9 hemoglobin is concentrated at 18.1 basic metabolic panel showed an elevated glucose of 324 a low bicarb of 14 and elevated creatinine of 2.09.  I also reviewed the " EKGs from the transferring hospital and there are multiple EKGs and rhythm strips sent with the patient and these demonstrate extensive artifact and many atopic beats.  On many of these tracings is difficult to discern the underlying rhythm and there are areas showing ST elevation as well as ST depression.  Inferior and anterior Q waves are noted.  I reviewed these EKGs with the cardiologist Dr. Saez here in this ER.    Laboratory  Laboratory testing has been started here in this ER and notably there is been a huge jump of the patient's liver function tests, creatinine remains elevated at 2.02 glucose remains elevated at 222 the bicarb is very low at 11 lactic acid level is 14.5 the troponin is now 5078 the INR and PTT are elevated    EKG interpretation  The patient is undergoing transcutaneous pacing therefore repeat EKGs off of pacing were not obtained the patient is too unstable to stop the transcutaneous pacer at this time.  This has been discussed with cardiology.    Radiology  EC-ECHOCARDIOGRAM LTD W/O CONT   Final Result      DX-CHEST-PORTABLE (1 VIEW)   Final Result      1.  Cartilage medially with minimal bilateral interstitial prominence, which may be due to early edema.   2.  Well-positioned lines and tubes.      CT-ABDOMEN-PELVIS WITH   Final Result      1.  Occlusion of the left external external iliac, common femoral, superficial femoral and profunda femoris arteries secondary to advanced atherosclerotic disease.   2.  Trace bilateral pleural effusions, left greater than right with associated compressive atelectasis and/or consolidation.   3.  Complex appearing left inferior renal cyst. Further evaluation of this finding is recommended with ultrasound or dedicated renal MRI when possible.   4.  Bilateral renal atrophy.   5.  Diverticulosis without evidence of diverticulitis.      CT-CTA CHEST PULMONARY ARTERY W/ RECONS   Final Result      1.  No pulmonary embolus.   2.  Small left pleural effusion  and associated atelectasis. No pulmonary consolidation.   3.  Emphysema.   4.  Incidental 6 mm nodule in the right upper lobe. Follow-up guidelines for high and low risk patients are outlined below.   5.  A 5 cm ascending aortic aneurysm.            Pulmonary nodule guidelines:      Low Risk: CT at 6-12 months, then consider CT at 18-24 months      High Risk: CT at 6-12 months, then CT at 18-24 months      Low Risk - Minimal or absent history of smoking and of other known risk factors.      High Risk - History of smoking or of other known risk factors.      Note: These recommendations do not apply to lung cancer screening, patients with immunosuppression, or patients with known primary cancer.      Fleischner Society 2017 Guidelines for Management of Incidentally Detected Pulmonary Nodules in Adults               CT-HEAD W/O   Final Result      1. No CT evidence of acute infarct, hemorrhage or mass.   2. Moderate global parenchymal atrophy. Chronic small vessel ischemic changes.   3. Old left occipital infarct.      OUTSIDE IMAGES-DX CHEST   Final Result        MEDICAL DECISION MAKING and DISPOSITION  In the emergency department the patient's resuscitation continued he is currently on multiple maxed out pressor agents he has received intravenous fluids and broad-spectrum antibiotics and is intubated with equal breath sounds with ventilator support.  Cardiology and pulmonology consultations have been obtained.  This patient is profoundly ill and currently receiving maximal support and is unlikely to survive this illness.  After discussion with the ICU pulmonologist the patient's CODE STATUS has been made DNR for medical futility in the event that he has another cardiac arrest he should undergo no further resuscitation.  At this point in time however the patient does have a pulse and he will be going to the ICU for further care.  I have discussed the case with the hospitalist the patient is referred to the hospitalist  service    IMPRESSION  1.  Myocardial infarction  2.  Liver failure  3.  Renal failure  4.  Respiratory failure  5.  Critical care time with this patient is 45 minutes         Electronically signed by: Wong Tapia M.D., 5/29/2020 2:46 PM

## 2020-05-29 NOTE — ASSESSMENT & PLAN NOTE
He underwent CT pulmonary and he found to have a 5 cm ascending aortic aneurysm.  He will need close follow-up.

## 2020-05-29 NOTE — ASSESSMENT & PLAN NOTE
Patient found to have elevated liver enzymes.  Most likely secondary to shock.  Admit to ICU.  Monitor with daily CMP

## 2020-05-29 NOTE — PROGRESS NOTES
Triage officer note     D/W Dr Tapia     74M, PMHx seizure disorder, HTN.  Transferred from [place] after he presented there w respiratory failure requiring intubation.  Got cardiac arrest in route and in the ED department.     Has encephalopathy, ? Related to arrest apparently he was given tPA, it doesn't look like he god a CT scan from the transferring facility per EDP.     Urgent workup was incomplete at the time of admission request ~ 12:50 [CT head, abdomen, pelvis, chest pending]     Cardiology and intensive are have been consulted by EDP    Will likely need inpatient ICU admission cardiac monitoring pending imaging results to r/o intracranial bleeding.  Admitting hospitalist is Dr Steele

## 2020-05-29 NOTE — H&P
Hospital Medicine History & Physical Note    Date of Service  5/29/2020    Primary Care Physician  Pcp Pt States None    Code Status  DNAR, I OK    Chief Complaint  Chief Complaint   Patient presents with   • Respiratory Arrest       History of Presenting Illness    At the time of evaluation patient was intubated and I obtained below information by performing chart review discussing it with the ER physician and with intensivist.    74 y.o. male with past medical history of seizure who presented to the hospital as a transfer from [facility] facility on 5/29/2020 with respiratory failure and shortness of breath.  Patient developed cardiac arrest after intubation and it lasted for approximately a minute.  He developed arrest again for 1 to 2 minutes in ER and he received CPR and bicarb drip.  He went a stat CT head without contrast and it did not show signs of hemorrhage or acute abnormalities.  He also underwent CTA pulmonary and it did not show any pulmonary embolism.  He found to have a 5 cm ascending aortic aneurysm.  He underwent echocardiogram and he found to have a low ejection fraction of 25%.  Intensivist and ER physician discussed about his CODE STATUS and changed to DNR developed another cardiac arrest.      ER course: I discussed finding of CT head, CTA pulmonary with intensivist Dr. Don as well as with ER physician Dr. Tapia.  He is going to be admitted to ICU for close monitoring.    Review of Systems  Review of Systems   Unable to perform ROS: Intubated       Past Medical History   has a past medical history of Fall, Seizure, and Unspecified urinary incontinence.    Surgical History   has a past surgical history that includes other orthopedic surgery and other neurological surg.     Family History  I was unable to review family history as patient intubated at the time of evaluation    Social History   reports that he has been smoking cigarettes. He has a 60.00 pack-year smoking history. He does not have  any smokeless tobacco history on file. He reports that he does not drink alcohol or use drugs.    Allergies  Allergies   Allergen Reactions   • Demerol      Pt states his heart stops when he is given this drug   • Flomax [Kdc:Brilliant Blue Fcf+Tamsulosin]        Medications  Prior to Admission Medications   Prescriptions Last Dose Informant Patient Reported? Taking?   amantadine (SYMMETREL) 100 MG CAPS   Yes No   Sig: Take 100 mg by mouth 2 times a day.   amitriptyline (ELAVIL) 50 MG TABS   Yes No   Sig: Take 50 mg by mouth every day.   amlodipine (NORVASC) 5 MG TABS   Yes No   Sig: Take 5 mg by mouth every day.   ascorbic acid (ASCORBIC ACID) 500 MG TABS   Yes No   Sig: Take 500 mg by mouth 2 Times a Day.   baclofen (LIORESAL) 10 MG TABS   Yes No   Sig: Take 300 mg by mouth 3 times a day.   benzonatate (TESSALON) 100 MG CAPS   Yes No   Sig: Take 100 mg by mouth 3 times a day.   diazepam (VALIUM) 5 MG TABS   Yes No   Sig: Take 5 mg by mouth 3 times a day as needed.   divalproex EC (DEPAKOTE) 250 MG TBEC   Yes No   Sig: Take 250 mg by mouth 3 times a day.   docusate sodium (COLACE) 100 MG CAPS   Yes No   Sig: Take 100 mg by mouth every day.   famotidine (PEPCID) 20 MG TABS   Yes No   Sig: Take 20 mg by mouth 2 times a day.   hydrochlorothiazide (HYDRODIURIL) 25 MG TABS   Yes No   Sig: Take 12.5 mg by mouth 2 Times a Day.   ketoconazole (NIZORAL) 2 % CREA   Yes No   Sig: Apply  to affected area(s) every day. For rash    lactobacillus rhamnosus, GG, (CULTURELLE) 10 B CELL CAPS   Yes No   Sig: Take 1 Cap by mouth every day.   lisinopril (PRINIVIL) 20 MG TABS   Yes No   Sig: Take 20 mg by mouth 2 times a day.   metoprolol SR (TOPROL XL) 25 MG TB24   Yes No   Sig: Take 25 mg by mouth 2 Times a Day.   multivitamin (THERAGRAN) TABS   Yes No   Sig: Take 1 Tab by mouth every day.   pregabalin (LYRICA) 75 MG CAPS   Yes No   Sig: Take 75 mg by mouth 2 times a day.   simvastatin (ZOCOR) 20 MG TABS   Yes No   Sig: Take 20 mg by  mouth every evening.   tramadol (ULTRAM) 50 MG TABS   Yes No   Sig: Take 100 mg by mouth 4 times a day.   vitamin D (CHOLECALCIFEROL) 1000 UNIT TABS   Yes No   Sig: Take 1,000 Units by mouth every day.      Facility-Administered Medications: None       Physical Exam  Temp:  [35.1 °C (95.1 °F)-35.2 °C (95.4 °F)] 35.2 °C (95.4 °F)  Pulse:  [] 59  Resp:  [19-31] 30  BP: (51-82)/(29-52) 63/40  SpO2:  [44 %-100 %] 66 %    Physical Exam  Vitals signs reviewed.   Constitutional:       General: He is not in acute distress.     Appearance: He is ill-appearing.      Interventions: He is intubated.   HENT:      Head: Normocephalic and atraumatic. No contusion.      Right Ear: External ear normal.      Left Ear: External ear normal.      Nose: Nose normal.      Mouth/Throat:      Mouth: Mucous membranes are dry.      Pharynx: No oropharyngeal exudate.   Eyes:      General:         Right eye: No discharge.         Left eye: No discharge.      Pupils: Pupils are equal, round, and reactive to light.   Neck:      Musculoskeletal: No neck rigidity or muscular tenderness.   Cardiovascular:      Rate and Rhythm: Normal rate and regular rhythm.      Heart sounds: No murmur. No friction rub. No gallop.    Pulmonary:      Effort: Respiratory distress present. He is intubated.      Breath sounds: Decreased air movement present. No wheezing or rhonchi.   Abdominal:      General: Bowel sounds are normal. There is no distension.      Palpations: Abdomen is soft.      Tenderness: There is no abdominal tenderness. There is no rebound.   Musculoskeletal: Normal range of motion.         General: No swelling or tenderness.   Skin:     General: Skin is warm and dry.      Coloration: Skin is pale. Skin is not jaundiced.   Neurological:      Mental Status: He is unresponsive.      Comments: I was unable to perform complete neuro exam as patient was intubated at the time of evaluation.   Psychiatric:      Comments: Unable to assess          Laboratory:  Recent Labs     05/29/20  1237   WBC 11.2*   RBC 3.40*   HEMOGLOBIN 10.6*   HEMATOCRIT 37.1*   .1*   MCH 31.2   MCHC 28.6*   RDW 55.0*   PLATELETCT 52*   MPV 11.3     Recent Labs     05/29/20  1237   SODIUM 139   POTASSIUM 5.7*   CHLORIDE 99   CO2 11*   GLUCOSE 222*   BUN 29*   CREATININE 2.02*   CALCIUM 7.9*     Recent Labs     05/29/20  1237   ALTSGPT 2461*   ASTSGOT 3715*   ALKPHOSPHAT 195*   TBILIRUBIN 2.1*   GLUCOSE 222*     Recent Labs     05/29/20  1237   APTT 217.0*   INR 3.57*     No results for input(s): NTPROBNP in the last 72 hours.      Recent Labs     05/29/20  1237   TROPONINT 5078*       Imaging:  EC-ECHOCARDIOGRAM LTD W/O CONT   Final Result      DX-CHEST-PORTABLE (1 VIEW)   Final Result      1.  Cartilage medially with minimal bilateral interstitial prominence, which may be due to early edema.   2.  Well-positioned lines and tubes.      CT-ABDOMEN-PELVIS WITH   Final Result      1.  Occlusion of the left external external iliac, common femoral, superficial femoral and profunda femoris arteries secondary to advanced atherosclerotic disease.   2.  Trace bilateral pleural effusions, left greater than right with associated compressive atelectasis and/or consolidation.   3.  Complex appearing left inferior renal cyst. Further evaluation of this finding is recommended with ultrasound or dedicated renal MRI when possible.   4.  Bilateral renal atrophy.   5.  Diverticulosis without evidence of diverticulitis.      CT-CTA CHEST PULMONARY ARTERY W/ RECONS   Final Result      1.  No pulmonary embolus.   2.  Small left pleural effusion and associated atelectasis. No pulmonary consolidation.   3.  Emphysema.   4.  Incidental 6 mm nodule in the right upper lobe. Follow-up guidelines for high and low risk patients are outlined below.   5.  A 5 cm ascending aortic aneurysm.            Pulmonary nodule guidelines:      Low Risk: CT at 6-12 months, then consider CT at 18-24 months       High Risk: CT at 6-12 months, then CT at 18-24 months      Low Risk - Minimal or absent history of smoking and of other known risk factors.      High Risk - History of smoking or of other known risk factors.      Note: These recommendations do not apply to lung cancer screening, patients with immunosuppression, or patients with known primary cancer.      Fleischner Society 2017 Guidelines for Management of Incidentally Detected Pulmonary Nodules in Adults               CT-HEAD W/O   Final Result      1. No CT evidence of acute infarct, hemorrhage or mass.   2. Moderate global parenchymal atrophy. Chronic small vessel ischemic changes.   3. Old left occipital infarct.      OUTSIDE IMAGES-DX CHEST   Final Result            Assessment/Plan:      Acute respiratory failure with hypoxia (HCC)  Assessment & Plan  Patient developed acute respiratory failure with hypoxia.  At the time of evaluation patient intubated.  I discussed with intensivist at the bedside.  Admit to ICU for close monitoring of his respiratory status.  COVID 19 test came back negative.   CTA pulmonary did not show pulmonary embolism.  Continue to provide him respiratory support.    Elevated liver enzymes  Assessment & Plan  Patient found to have elevated liver enzymes.  Most likely secondary to shock.  Admit to ICU.  Monitor with daily CMP    Ascending aortic aneurysm (HCC)  Assessment & Plan  He underwent CT pulmonary and he found to have a 5 cm ascending aortic aneurysm.  He will need close follow-up.    Shock (HCC)  Assessment & Plan  He found to have shock it could be septic shock or cardiogenic.  Started him on pressor pain  Broad-spectrum antibiotic.  IV fluids.  Admit to ICU for close monitoring.    Bradycardia  Assessment & Plan  Cardiology consulted  Transcutaneous pacing for now he may need transvenous pacing.    Cardiac arrest (HCC)  Assessment & Plan  Cardiac arrest status post CPR.  Admit to ICU for monitoring    I personally discussed  case with intensivist Dr. Don and ER physician Dr. Tapia.

## 2020-05-29 NOTE — ASSESSMENT & PLAN NOTE
Patient developed acute respiratory failure with hypoxia.  At the time of evaluation patient intubated.  I discussed with intensivist at the bedside.  Admit to ICU for close monitoring of his respiratory status.  COVID 19 test came back negative.   CTA pulmonary did not show pulmonary embolism.  Continue to provide him respiratory support.

## 2020-05-29 NOTE — ASSESSMENT & PLAN NOTE
Cardiac arrest, multiple, short duration  Ct head adequatre  Ascending aortic aneurysm  Cardiogenic and septic shock  Multiple pressors  No family available  ? Septic shock

## 2020-05-30 NOTE — PROGRESS NOTES
Patient arrived to from ER at 1509. Patient non responsive, not sedated, all gtts and lines verified. MD at bedside, order for lactic and H/H received due to 200 ml of bloody output from OG tube. Heparin gtt also stopped. Patient being 100% paced with transcutaneous pacer at rate of 60, blood pressure 100 systolic, per MD patient to go to cath lab for TV pacer. Patient to cath lab at 1620.

## 2020-05-30 NOTE — PROGRESS NOTES
Patient returned from cath lab at 1730 with cath lab staff. TV pacer in place [facility], paced 100% at rate 80, MA 5, sensitivity 0.8. patient blood pressure in the 60's systolic on four pressors, jamari gtt titrated to max dose, MA increased to 10 on pacer, blood pressure continuing to drop, MD paged emergently to bedside. Patient is DNAR, patient went into idioventricular rhythm, art line wave form flat, MD performed ultrasound heart, MD called time of death at 1734.

## 2020-05-30 NOTE — DISCHARGE SUMMARY
Death Summary    Cause of Death  cardiac arrest due to acute systolic and diastolic heart failure due respiratory failure with hypoxia due to septic shock due to cardiogenic shock.    Comorbid Conditions at the Time of Death  Active Problems:    Acute respiratory failure with hypoxia (HCC) POA: Unknown    Cardiac arrest (HCC) POA: Unknown    Bradycardia POA: Unknown    Shock (HCC) POA: Unknown    Metabolic acidosis, increased anion gap POA: Unknown    Hyperglycemia POA: Unknown    Ascending aortic aneurysm (HCC) POA: Unknown    Elevated liver enzymes POA: Unknown  Resolved Problems:    * No resolved hospital problems. *      History of Presenting Illness and Hospital Course  This is a 74 y.o. male admitted 5/29/2020 with respiratory failure with hypoxia.  Admitted to outside facility and had presented with shortness of breath. He had PEA cardiac arrest at that facility with CPR and ROSC, then another cardiac arrest on the transport to this facility. In the ER he had another cardiac arrest, PEA, and had ROSC.  EF 25% on ECHO that was formal. He was transported on levophed and dobutamine drips.  He did receive IVF.  Severe metabolic acidosis.  Subcutaneously paced for bradycardia.  He was maxed on levophed, vasopressin, neosynephrine and epinephrine.  CT imaging with ascending aortic aneurysm and occluded lower ext vessels likely chronic.  Mottled in appearance.  He was made DNAR per 2 physician decision give maximum therapy vasopressors of which CPR would be non beneficial.  He went to cath lab and got transvenous pacing wires.  He then had another cardiac arrest with vtach on monitor and no pulses.  Cardiac standstill on bedside ultrasound.  He passed and care was withdrawn.  Caretaker updated. Patient was a  of  services.  I pronounced at bedside 17:34.      Pronounced per myself at bedside.      Time spent on discharge summary less than 30 minutes.  25 minutes.

## 2020-05-30 NOTE — PROGRESS NOTES
Critical care progress note  Patient passed away due to vtach.  No pulse. Pronounced at bedside.  Updated caretaker Joyce.

## 2020-05-30 NOTE — PROCEDURES
Name of the Procedure: Temporary pacer wire placement    Indication: Asystole, external pacer dependent, post cardiac arrest      Procedure in Detail:    After emergency consent, patient was brought to the cardiac catheterization lab where the left side of the neck was prepped in sterile fashion. Left internal jugular vein was accessed with 6 Fr sheath under ultrasound guidance.  A balloon temporary pacer wire was placed in right ventricular apex.  Threshold was measured less than 1.5 mA and secured using a suture.      Complications: none    EBL < 10 cc    Specimens: none      Electronically signed by   Jasmeet Milligan M.D., MD  5/29/2020  5:50 PM

## 2020-05-31 LAB
BACTERIA UR CULT: ABNORMAL
BACTERIA UR CULT: ABNORMAL
SIGNIFICANT IND 70042: ABNORMAL
SITE SITE: ABNORMAL
SOURCE SOURCE: ABNORMAL

## 2020-06-02 PROCEDURE — 700117 HCHG RX CONTRAST REV CODE 255: Performed by: EMERGENCY MEDICINE
